# Patient Record
Sex: MALE | Race: BLACK OR AFRICAN AMERICAN | NOT HISPANIC OR LATINO | Employment: OTHER | URBAN - METROPOLITAN AREA
[De-identification: names, ages, dates, MRNs, and addresses within clinical notes are randomized per-mention and may not be internally consistent; named-entity substitution may affect disease eponyms.]

---

## 2022-03-22 ENCOUNTER — TELEPHONE (OUTPATIENT)
Dept: UROLOGY | Facility: CLINIC | Age: 77
End: 2022-03-22
Payer: OTHER GOVERNMENT

## 2022-04-21 ENCOUNTER — OFFICE VISIT (OUTPATIENT)
Dept: UROLOGY | Facility: CLINIC | Age: 77
End: 2022-04-21
Payer: OTHER GOVERNMENT

## 2022-04-21 DIAGNOSIS — N13.8 BPH WITH URINARY OBSTRUCTION: Primary | ICD-10-CM

## 2022-04-21 DIAGNOSIS — R97.20 ABNORMAL PSA: ICD-10-CM

## 2022-04-21 DIAGNOSIS — N40.1 BPH WITH URINARY OBSTRUCTION: Primary | ICD-10-CM

## 2022-04-21 LAB — POC RESIDUAL URINE VOLUME: 0 ML (ref 0–100)

## 2022-04-21 PROCEDURE — 51798 US URINE CAPACITY MEASURE: CPT | Mod: S$GLB,,, | Performed by: NURSE PRACTITIONER

## 2022-04-21 PROCEDURE — 99204 OFFICE O/P NEW MOD 45 MIN: CPT | Mod: S$GLB,,, | Performed by: NURSE PRACTITIONER

## 2022-04-21 PROCEDURE — 99204 PR OFFICE/OUTPT VISIT, NEW, LEVL IV, 45-59 MIN: ICD-10-PCS | Mod: S$GLB,,, | Performed by: NURSE PRACTITIONER

## 2022-04-21 PROCEDURE — 51798 POCT BLADDER SCAN: ICD-10-PCS | Mod: S$GLB,,, | Performed by: NURSE PRACTITIONER

## 2022-04-21 RX ORDER — TADALAFIL 20 MG/1
20 TABLET ORAL DAILY
Qty: 5 TABLET | Refills: 11 | Status: SHIPPED | OUTPATIENT
Start: 2022-04-21 | End: 2023-04-21

## 2022-04-21 RX ORDER — LOSARTAN POTASSIUM 25 MG/1
100 TABLET ORAL DAILY
COMMUNITY

## 2022-04-21 RX ORDER — OMEPRAZOLE 20 MG/1
20 CAPSULE, DELAYED RELEASE ORAL DAILY
COMMUNITY

## 2022-04-21 RX ORDER — ROSUVASTATIN CALCIUM 40 MG/1
10 TABLET, COATED ORAL NIGHTLY
COMMUNITY

## 2022-04-21 RX ORDER — ASPIRIN 81 MG/1
81 TABLET ORAL DAILY
COMMUNITY

## 2022-04-21 RX ORDER — HYDROCHLOROTHIAZIDE 25 MG/1
25 TABLET ORAL DAILY
COMMUNITY

## 2022-04-21 RX ORDER — TAMSULOSIN HYDROCHLORIDE 0.4 MG/1
CAPSULE ORAL DAILY
COMMUNITY

## 2022-04-21 RX ORDER — AMLODIPINE BESYLATE 10 MG/1
10 TABLET ORAL DAILY
COMMUNITY

## 2022-04-21 NOTE — PROGRESS NOTES
Subjective:       Patient ID: Indra Valladares is a 76 y.o. male.    Chief Complaint: New pt, Abnormal PSA, and BPH with Obs      HPI: 76-year-old male new to [facility].  Patient has a PSA currently a 2.7 March 10, 2022.  He has been as high as 4.10 back in May of 2019. Patient has no known family history of prostate cancer.  He is being treated for BPH with LUTS.  He is on Flomax 0.4 mg daily with good response.  He states since going on the medication he is no longer getting up at night.  Stream is strong and runs from beginning to end.  He feels empty at completion.  He does report some mild erectile dysfunction he is able to get an erection but not maintain it.  This has been ongoing for quite some time.  No other urologic concerns.         Past Medical History:   Past Medical History:   Diagnosis Date    Anxiety disorder, unspecified     BPH with urinary obstruction     Depression     History of elevated PSA     HTN (hypertension)     Insomnia        Past Surgical Historical:   Past Surgical History:   Procedure Laterality Date    KNEE SURGERY Right         Medications:   Medication List with Changes/Refills   Current Medications    AMLODIPINE (NORVASC) 10 MG TABLET    Take 10 mg by mouth once daily.    ASPIRIN (ECOTRIN) 81 MG EC TABLET    Take 81 mg by mouth once daily.    HYDROCHLOROTHIAZIDE (HYDRODIURIL) 25 MG TABLET    Take 25 mg by mouth once daily.    LOSARTAN (COZAAR) 25 MG TABLET    Take 100 mg by mouth once daily.    OMEPRAZOLE (PRILOSEC) 20 MG CAPSULE    Take 20 mg by mouth once daily.    ROSUVASTATIN (CRESTOR) 40 MG TAB    Take 10 mg by mouth every evening.    TAMSULOSIN (FLOMAX) 0.4 MG CAP    Take by mouth once daily.        Past Social History:   Social History     Socioeconomic History    Marital status:    Tobacco Use    Smoking status: Never Smoker    Smokeless tobacco: Never Used   Substance and Sexual Activity    Alcohol use: Yes        Allergies:   Review of patient's allergies indicates:   Allergen Reactions    Lisinopril     Pcn [penicillins]         Family History: History reviewed. No pertinent family history.     Review of Systems:  Review of Systems   Constitutional: Negative for activity change and appetite change.   HENT: Negative for congestion and dental problem.    Eyes: Negative for visual disturbance.   Respiratory: Negative for chest tightness and shortness of breath.    Cardiovascular: Negative for chest pain.   Gastrointestinal: Negative for abdominal distention and abdominal pain.   Genitourinary: Negative for decreased urine volume, difficulty urinating, dysuria, enuresis, flank pain, frequency, genital sores, hematuria, penile discharge, penile pain, penile swelling, scrotal swelling, testicular pain and urgency.   Musculoskeletal: Negative for back pain and neck pain.   Skin: Negative for color change.   Neurological: Negative for dizziness.   Hematological: Negative for adenopathy.   Psychiatric/Behavioral: Negative for agitation, behavioral problems and confusion.       Physical Exam:  Physical Exam  Constitutional:       Appearance: He is well-developed.   HENT:      Head: Normocephalic.   Eyes:      General: No scleral icterus.  Pulmonary:      Effort: Pulmonary effort is normal.      Breath sounds: Normal breath sounds.   Abdominal:      General: There is no distension.      Palpations: Abdomen is soft.      Tenderness: There is no abdominal tenderness.      Hernia: No hernia is present. There is no hernia in the right inguinal area or left inguinal area.   Genitourinary:     Penis: Normal.       Testes: Normal. Cremasteric reflex is present.      Comments: SHAUNA--normal rectal tone.  Prostate is smooth flat symmetrical benign-feeling without discrete nodules induration or tenderness  Musculoskeletal:      Cervical back: Normal range of motion.   Skin:     General: Skin is warm and dry.   Neurological:      Mental  Status: He is alert and oriented to person, place, and time.         Assessment/Plan:   BPH with LUTS if, PVR 0 mL.  PSA was drawn today to ensure stability.  Otherwise continue on Flomax 0.4 he [facility].    Erectile dysfunction--we discussed trial of Cialis 20 mg which I sent to his pharmacy of record.  We also discussed penile ring, vacuum erection device, pep injection and penile implant.  Questions were answered to patient's satisfaction.  For now he and his spouse would like to try Cialis 20 mg with the penile ring.    Problem List Items Addressed This Visit    None     Visit Diagnoses     BPH with urinary obstruction    -  Primary    Relevant Orders    POCT Bladder Scan (Completed)    Prostate Specific Antigen, Diagnostic    Abnormal PSA        Relevant Orders    POCT Urinalysis (w/Micro Option)    POCT Bladder Scan (Completed)    Prostate Specific Antigen, Diagnostic

## 2022-04-22 LAB — PSA, DIAGNOSTIC: 2.16 NG/ML (ref 0–4)

## 2023-04-21 ENCOUNTER — OFFICE VISIT (OUTPATIENT)
Dept: UROLOGY | Facility: CLINIC | Age: 78
End: 2023-04-21
Payer: OTHER GOVERNMENT

## 2023-04-21 VITALS — SYSTOLIC BLOOD PRESSURE: 142 MMHG | HEART RATE: 104 BPM | DIASTOLIC BLOOD PRESSURE: 86 MMHG

## 2023-04-21 DIAGNOSIS — N13.8 BPH WITH URINARY OBSTRUCTION: Primary | ICD-10-CM

## 2023-04-21 DIAGNOSIS — N52.9 ERECTILE DYSFUNCTION, UNSPECIFIED ERECTILE DYSFUNCTION TYPE: ICD-10-CM

## 2023-04-21 DIAGNOSIS — N40.1 BPH WITH URINARY OBSTRUCTION: Primary | ICD-10-CM

## 2023-04-21 LAB — PSA, DIAGNOSTIC: 2.13 NG/ML (ref 0–4)

## 2023-04-21 PROCEDURE — 99214 OFFICE O/P EST MOD 30 MIN: CPT | Mod: S$GLB,,, | Performed by: NURSE PRACTITIONER

## 2023-04-21 PROCEDURE — 99214 PR OFFICE/OUTPT VISIT, EST, LEVL IV, 30-39 MIN: ICD-10-PCS | Mod: S$GLB,,, | Performed by: NURSE PRACTITIONER

## 2023-04-21 RX ORDER — SILDENAFIL 100 MG/1
100 TABLET, FILM COATED ORAL DAILY PRN
Qty: 5 TABLET | Refills: 11 | Status: SHIPPED | OUTPATIENT
Start: 2023-04-21 | End: 2024-04-20

## 2023-04-21 RX ORDER — PANTOPRAZOLE SODIUM 40 MG/1
TABLET, DELAYED RELEASE ORAL
COMMUNITY
Start: 2023-04-14

## 2023-04-21 NOTE — PROGRESS NOTES
Subjective:       Patient ID: Indra Valladares is a 77 y.o. male.    Chief Complaint: Benign Prostatic Hypertrophy      HPI: 67-year-old male, established patient, presents for yearly visit.    Patient has history BPH with obstruction.  He is maintained on Flomax 0.4 mg daily.    [facility]'s [facility] provides [facility].    Patient states he does well with the medication.  If he forgets to take it he does know it.  States he is a good stream.  Denies any difficulty voiding.  Denies any significant frequency, urgency, or nocturia.      Patient does have a history of erectile dysfunction.  Last year the patient was started on Cialis 10 mg as needed.  Patient states this does help with erections.  However, he has a hangover like effect the next day.    He would like to try an alternative medication.      PSA 1 year ago was 2.16.  In May of 2019 is PSA was as high as 4.10.  Denies any blood in urine.  Denies any significant weight loss.  Denies any new onset bone pain.    No other urinary complaints at this time.       Past Medical History:   Past Medical History:   Diagnosis Date    Anxiety disorder, unspecified     Benign prostatic hyperplasia with lower urinary tract symptoms     BPH with urinary obstruction     Depression     History of elevated PSA     HTN (hypertension)     Insomnia        Past Surgical Historical:   Past Surgical History:   Procedure Laterality Date    KNEE SURGERY Right         Medications:   Medication List with Changes/Refills   New Medications    SILDENAFIL (VIAGRA) 100 MG TABLET    Take 1 tablet (100 mg total) by mouth daily as needed for Erectile Dysfunction.   Current Medications    AMLODIPINE (NORVASC) 10 MG TABLET    Take 10 mg by mouth once daily.    ASPIRIN (ECOTRIN) 81 MG EC TABLET    Take 81 mg by mouth once daily.    HYDROCHLOROTHIAZIDE (HYDRODIURIL) 25 MG TABLET    Take 25 mg by mouth once daily.    LOSARTAN (COZAAR) 25 MG TABLET    Take 100 mg by  mouth once daily.    OMEPRAZOLE (PRILOSEC) 20 MG CAPSULE    Take 20 mg by mouth once daily.    PANTOPRAZOLE (PROTONIX) 40 MG TABLET        ROSUVASTATIN (CRESTOR) 40 MG TAB    Take 10 mg by mouth every evening.    TADALAFIL (CIALIS) 20 MG TAB    Take 1 tablet (20 mg total) by mouth once daily.    TAMSULOSIN (FLOMAX) 0.4 MG CAP    Take by mouth once daily.        Past Social History:   Social History     Socioeconomic History    Marital status:    Tobacco Use    Smoking status: Never    Smokeless tobacco: Never   Substance and Sexual Activity    Alcohol use: Yes    Drug use: Never    Sexual activity: Yes       Allergies:   Review of patient's allergies indicates:   Allergen Reactions    Lisinopril     Pcn [penicillins]         Family History: History reviewed. No pertinent family history.     Review of Systems:  Review of Systems   Constitutional:  Negative for activity change and appetite change.   HENT:  Negative for congestion and dental problem.    Eyes:  Negative for visual disturbance.   Respiratory:  Negative for chest tightness and shortness of breath.    Cardiovascular:  Negative for chest pain.   Gastrointestinal:  Negative for abdominal distention and abdominal pain.   Genitourinary:  Negative for decreased urine volume, difficulty urinating, dysuria, enuresis, flank pain, frequency, genital sores, hematuria, penile discharge, penile pain, penile swelling, scrotal swelling, testicular pain and urgency.   Musculoskeletal:  Negative for back pain and neck pain.   Skin:  Negative for color change.   Neurological:  Negative for dizziness.   Hematological:  Negative for adenopathy.   Psychiatric/Behavioral:  Negative for agitation, behavioral problems and confusion.      Physical Exam:  Physical Exam  Vitals and nursing note reviewed.   Constitutional:       Appearance: He is well-developed.   HENT:      Head: Normocephalic.   Eyes:      Pupils: Pupils are equal, round, and reactive to light.    Cardiovascular:      Rate and Rhythm: Normal rate and regular rhythm.      Heart sounds: Normal heart sounds.   Pulmonary:      Effort: Pulmonary effort is normal.      Breath sounds: Normal breath sounds.   Abdominal:      General: Bowel sounds are normal.      Palpations: Abdomen is soft.   Genitourinary:     Penis: Normal.       Prostate: Enlarged (Prostate is enlarged.  Prostate smooth with no nodules and nontender.  Prostate is symmetrical.).      Rectum: Normal.   Musculoskeletal:         General: Normal range of motion.      Cervical back: Normal range of motion and neck supple.   Skin:     General: Skin is warm and dry.   Neurological:      Mental Status: He is alert and oriented to person, place, and time.   Psychiatric:         Behavior: Behavior normal.     Bladder scan: 0 cc    Assessment/Plan:   1. BPH with obstruction:  Check the patient's PSA.  We will notify him of the results.    Patient is doing Flomax 0.4 mg daily.  Medication provided by PCP.      2. Erectile dysfunction:  Tadalafil 20 mg provides a hangover like effect the next day.    Will start patient on sildenafil 100 mg daily as needed.  Patient instructed to start off with half a tab and if needed can increase to a full tab.    Did discuss risk.  This discussed risk of priapism or chest pain.  Patient stated understanding.  Patient will notify us of if sildenafil works.  If not will arrange follow-up to discuss Pep injections.      Plan follow-up in 1 year, sooner if needed.  Problem List Items Addressed This Visit    None  Visit Diagnoses       BPH with urinary obstruction    -  Primary    Relevant Orders    Prostate Specific Antigen, Diagnostic    POCT Bladder Scan    Erectile dysfunction, unspecified erectile dysfunction type        Relevant Medications    sildenafiL (VIAGRA) 100 MG tablet

## 2024-04-26 ENCOUNTER — OFFICE VISIT (OUTPATIENT)
Dept: UROLOGY | Facility: CLINIC | Age: 79
End: 2024-04-26
Payer: OTHER GOVERNMENT

## 2024-04-26 VITALS
HEART RATE: 69 BPM | HEIGHT: 64 IN | DIASTOLIC BLOOD PRESSURE: 80 MMHG | BODY MASS INDEX: 30.39 KG/M2 | SYSTOLIC BLOOD PRESSURE: 150 MMHG | WEIGHT: 178 LBS | OXYGEN SATURATION: 95 %

## 2024-04-26 DIAGNOSIS — R31.29 HEMATURIA, MICROSCOPIC: ICD-10-CM

## 2024-04-26 DIAGNOSIS — N40.1 BPH WITH URINARY OBSTRUCTION: Primary | ICD-10-CM

## 2024-04-26 DIAGNOSIS — N52.9 ERECTILE DYSFUNCTION, UNSPECIFIED ERECTILE DYSFUNCTION TYPE: ICD-10-CM

## 2024-04-26 DIAGNOSIS — N13.8 BPH WITH URINARY OBSTRUCTION: Primary | ICD-10-CM

## 2024-04-26 LAB
BILIRUBIN, UA POC OHS: NEGATIVE
BLOOD, UA POC OHS: ABNORMAL
CLARITY, UA POC OHS: CLEAR
COLOR, UA POC OHS: YELLOW
GLUCOSE, UA POC OHS: NEGATIVE
KETONES, UA POC OHS: NEGATIVE
LEUKOCYTES, UA POC OHS: NEGATIVE
NITRITE, UA POC OHS: NEGATIVE
PH, UA POC OHS: 7
PROTEIN, UA POC OHS: NEGATIVE
PSA, DIAGNOSTIC: 2.98 NG/ML (ref 0.1–4)
SPECIFIC GRAVITY, UA POC OHS: 1.02
UROBILINOGEN, UA POC OHS: 2

## 2024-04-26 PROCEDURE — 81003 URINALYSIS AUTO W/O SCOPE: CPT | Mod: QW,S$GLB,, | Performed by: NURSE PRACTITIONER

## 2024-04-26 PROCEDURE — 99214 OFFICE O/P EST MOD 30 MIN: CPT | Mod: S$GLB,,, | Performed by: NURSE PRACTITIONER

## 2024-04-26 RX ORDER — TAMSULOSIN HYDROCHLORIDE 0.4 MG/1
0.4 CAPSULE ORAL DAILY
Qty: 90 CAPSULE | Refills: 3 | Status: SHIPPED | OUTPATIENT
Start: 2024-04-26 | End: 2025-04-26

## 2024-04-26 NOTE — PROGRESS NOTES
Subjective:       Patient ID: Indra Valladares is a 78 y.o. male.    Chief Complaint: Elevated PSA      HPI: 70-year-old male, established patient, presents for yearly visit.    Patient has history BPH with obstruction.  He is maintained on Flomax 0.4 mg daily.    Patient states he is doing pretty well.  Denies any pain or burning urination.  Denies any difficulty voiding.  States he has a pretty good stream from start to finish.  Denies any significant frequency, urgency, or nocturia.    He is history of erectile dysfunction.    Patient was on tadalafil.  States it gave him a hangover like effect.  We switched him to sildenafil 100 mg as needed at last visit 1 year ago.  Patient states that sildenafil also gives him a hangover like effect.      Patient did have a PSA of 4.10 in May 2019.  His last PSA was 2.130.    Denies any unexpected weight loss.  Denies any onset bone pain.      No other urinary complaints at this time.         Past Medical History:   Past Medical History:   Diagnosis Date    Anxiety disorder, unspecified     Benign prostatic hyperplasia with lower urinary tract symptoms     BPH with urinary obstruction     Depression     History of elevated PSA     HTN (hypertension)     Insomnia        Past Surgical Historical:   Past Surgical History:   Procedure Laterality Date    KNEE SURGERY Right         Medications:   Medication List with Changes/Refills   Current Medications    AMLODIPINE (NORVASC) 10 MG TABLET    Take 10 mg by mouth once daily.    ASPIRIN (ECOTRIN) 81 MG EC TABLET    Take 81 mg by mouth once daily.    HYDROCHLOROTHIAZIDE (HYDRODIURIL) 25 MG TABLET    Take 25 mg by mouth once daily.    LOSARTAN (COZAAR) 25 MG TABLET    Take 100 mg by mouth once daily.    OMEPRAZOLE (PRILOSEC) 20 MG CAPSULE    Take 20 mg by mouth once daily.    PANTOPRAZOLE (PROTONIX) 40 MG TABLET        ROSUVASTATIN (CRESTOR) 40 MG TAB    Take 10 mg by mouth every evening.    SILDENAFIL (VIAGRA) 100 MG TABLET    Take 1  tablet (100 mg total) by mouth daily as needed for Erectile Dysfunction.    TADALAFIL (CIALIS) 20 MG TAB    Take 1 tablet (20 mg total) by mouth once daily.   Changed and/or Refilled Medications    Modified Medication Previous Medication    TAMSULOSIN (FLOMAX) 0.4 MG CAP tamsulosin (FLOMAX) 0.4 mg Cap       Take 1 capsule (0.4 mg total) by mouth once daily.    Take by mouth once daily.        Past Social History:   Social History     Socioeconomic History    Marital status:    Tobacco Use    Smoking status: Never     Passive exposure: Never    Smokeless tobacco: Never   Substance and Sexual Activity    Alcohol use: Yes    Drug use: Never    Sexual activity: Yes       Allergies:   Review of patient's allergies indicates:   Allergen Reactions    Lisinopril     Pcn [penicillins]         Family History: No family history on file.     Review of Systems:  Review of Systems   Constitutional:  Negative for activity change and appetite change.   HENT:  Negative for congestion and dental problem.    Eyes:  Negative for visual disturbance.   Respiratory:  Negative for chest tightness and shortness of breath.    Cardiovascular:  Negative for chest pain.   Gastrointestinal:  Negative for abdominal distention and abdominal pain.   Genitourinary:  Negative for decreased urine volume, difficulty urinating, dysuria, enuresis, flank pain, frequency, genital sores, hematuria, penile discharge, penile pain, penile swelling, scrotal swelling, testicular pain and urgency.   Musculoskeletal:  Negative for back pain and neck pain.   Skin:  Negative for color change.   Neurological:  Negative for dizziness.   Hematological:  Negative for adenopathy.   Psychiatric/Behavioral:  Negative for agitation, behavioral problems and confusion.        Physical Exam:  Physical Exam  Vitals and nursing note reviewed.   Constitutional:       Appearance: He is well-developed.   HENT:      Head: Normocephalic.   Eyes:      Pupils: Pupils are equal,  round, and reactive to light.   Cardiovascular:      Rate and Rhythm: Normal rate and regular rhythm.      Heart sounds: Normal heart sounds.   Pulmonary:      Effort: Pulmonary effort is normal.      Breath sounds: Normal breath sounds.   Abdominal:      General: Bowel sounds are normal.      Palpations: Abdomen is soft.   Musculoskeletal:         General: Normal range of motion.      Cervical back: Normal range of motion and neck supple.   Skin:     General: Skin is warm and dry.   Neurological:      Mental Status: He is alert and oriented to person, place, and time.   Psychiatric:         Behavior: Behavior normal.       Urinalysis: Trace intact blood, red blood cells 0-2.    Bladder scan:  3 cc    Assessment/Plan:   1. BPH with obstruction:  Check the patient's PSA.  We will notify him of the results.    Patient is doing Flomax 0.4 mg daily.  Bladder scan is good at 3 cc.    Patient continue Flomax.  [facility]      2. Erectile dysfunction:  Patient gets a  side effects with both tadalafil and sildenafil.    We did discuss penile injections.  Patient declines at this time.    He has sildenafil on hand.  States he will continue and notify us if he wants to try something else or needs refills.      3. Microscopic hematuria:  Urinalysis today shows trace intact blood with red blood cells 0-2.    Last urinalysis 1 year ago was completely normal.    Will repeat UA at next visit.      Follow-up in 1 year, sooner if needed.    Problem List Items Addressed This Visit    None  Visit Diagnoses       BPH with urinary obstruction    -  Primary    Relevant Medications    tamsulosin (FLOMAX) 0.4 mg Cap    Other Relevant Orders    POCT Bladder Scan    Prostate Specific Antigen, Diagnostic    POCT Urinalysis(Instrument)    Erectile dysfunction, unspecified erectile dysfunction type        Hematuria, microscopic

## 2024-04-29 ENCOUNTER — TELEPHONE (OUTPATIENT)
Dept: UROLOGY | Facility: CLINIC | Age: 79
End: 2024-04-29
Payer: OTHER GOVERNMENT

## 2024-04-29 NOTE — TELEPHONE ENCOUNTER
Pt was notified of the normal PSA result. He verbalized understanding. ----- Message from Yasmani Baron NP sent at 4/29/2024  8:19 AM CDT -----  PSA is in the normal range.